# Patient Record
Sex: FEMALE | Race: BLACK OR AFRICAN AMERICAN | NOT HISPANIC OR LATINO | Employment: FULL TIME | URBAN - METROPOLITAN AREA
[De-identification: names, ages, dates, MRNs, and addresses within clinical notes are randomized per-mention and may not be internally consistent; named-entity substitution may affect disease eponyms.]

---

## 2018-11-06 ENCOUNTER — OCCUPATIONAL HEALTH (OUTPATIENT)
Dept: URGENT CARE | Facility: CLINIC | Age: 43
End: 2018-11-06

## 2018-11-06 DIAGNOSIS — Z02.83 ENCOUNTER FOR DRUG SCREENING: Primary | ICD-10-CM

## 2024-03-15 DIAGNOSIS — M25.562 ACUTE PAIN OF LEFT KNEE: Primary | ICD-10-CM

## 2024-03-18 ENCOUNTER — OFFICE VISIT (OUTPATIENT)
Dept: ORTHOPEDICS | Facility: CLINIC | Age: 49
End: 2024-03-18
Payer: MEDICAID

## 2024-03-18 ENCOUNTER — HOSPITAL ENCOUNTER (OUTPATIENT)
Dept: RADIOLOGY | Facility: HOSPITAL | Age: 49
Discharge: HOME | End: 2024-03-18
Attending: NURSE PRACTITIONER
Payer: MEDICAID

## 2024-03-18 DIAGNOSIS — M25.462 KNEE EFFUSION, LEFT: ICD-10-CM

## 2024-03-18 DIAGNOSIS — M17.12 PRIMARY OSTEOARTHRITIS OF LEFT KNEE: ICD-10-CM

## 2024-03-18 DIAGNOSIS — M25.562 ACUTE PAIN OF LEFT KNEE: Primary | ICD-10-CM

## 2024-03-18 DIAGNOSIS — M25.562 ACUTE PAIN OF LEFT KNEE: ICD-10-CM

## 2024-03-18 PROCEDURE — 73562 X-RAY EXAM OF KNEE 3: CPT | Mod: TC,LT

## 2024-03-18 PROCEDURE — 1159F MED LIST DOCD IN RCRD: CPT | Mod: CPTII,,, | Performed by: NURSE PRACTITIONER

## 2024-03-18 PROCEDURE — 99999 PR PBB SHADOW E&M-EST. PATIENT-LVL III: CPT | Mod: PBBFAC,,, | Performed by: NURSE PRACTITIONER

## 2024-03-18 PROCEDURE — 97110 THERAPEUTIC EXERCISES: CPT | Mod: GP,,, | Performed by: NURSE PRACTITIONER

## 2024-03-18 PROCEDURE — 3044F HG A1C LEVEL LT 7.0%: CPT | Mod: CPTII,,, | Performed by: NURSE PRACTITIONER

## 2024-03-18 PROCEDURE — 99213 OFFICE O/P EST LOW 20 MIN: CPT | Mod: PBBFAC,25 | Performed by: NURSE PRACTITIONER

## 2024-03-18 PROCEDURE — 99203 OFFICE O/P NEW LOW 30 MIN: CPT | Mod: S$PBB,,, | Performed by: NURSE PRACTITIONER

## 2024-03-18 PROCEDURE — 1160F RVW MEDS BY RX/DR IN RCRD: CPT | Mod: CPTII,,, | Performed by: NURSE PRACTITIONER

## 2024-03-18 RX ORDER — HYDROCHLOROTHIAZIDE 12.5 MG/1
1 CAPSULE ORAL DAILY
COMMUNITY
Start: 2023-12-19 | End: 2024-12-18

## 2024-03-18 RX ORDER — METFORMIN HYDROCHLORIDE 500 MG/1
500 TABLET, EXTENDED RELEASE ORAL 2 TIMES DAILY
COMMUNITY
Start: 2023-12-19 | End: 2024-12-18

## 2024-03-18 RX ORDER — LEVONORGESTREL 52 MG/1
1 INTRAUTERINE DEVICE INTRAUTERINE ONCE
COMMUNITY

## 2024-03-18 RX ORDER — DICLOFENAC SODIUM 75 MG/1
75 TABLET, DELAYED RELEASE ORAL 2 TIMES DAILY
COMMUNITY
Start: 2024-03-12

## 2024-03-18 RX ORDER — DULAGLUTIDE 1.5 MG/.5ML
1.5 INJECTION, SOLUTION SUBCUTANEOUS
COMMUNITY
Start: 2024-01-30

## 2024-03-18 NOTE — PROGRESS NOTES
Subjective:      New patient visit: Left Knee pain:     Patient ID: Mick Thacker is a 48 y.o. female here for new pt visit for left knee pain.     Date of Injury: 24     Chief Complaint: Lt knee pain     HPI:  Patient presents for evaluation and treatment of left knee pain. She is referred by [facility]( KARIME Zhu MD). She states onset has been off and on for the past year or so. She states that she was seeing her PCP recently and was treated with Diclofenac. She states that she was having increased pain in the left lower leg. She reports having an arterial and venous scan done on 24, both negative except the venous us showed a small bakers cyst. She reports having increased pain in the knee after the knee popped on 24. She states that she was arising after sitting on a couch and had immediate pain and difficulty bearing weight. She reported to the ER the following day at [facility]. She was informed that she may have internal derangement. She was instructed to continue her Diclofenac, placed on crutches and instructed to see orthopedics.     She presents and rates her pain as 4/10. She states that the pain has decreased since the reported injury. The pain is over the anterior knee. She describes the pain as throbbing. She has pain to extend the knee. Current symptoms include: pain directly over the anterior knee and patella. Pain is aggravated by any weight bearing, ADLs or ROM. She denies any locking but states that the knee has giving way. She is noted with a limp. She states that she can not fully extend the knee due to discomfort. She states that she tried a brace but would not stay in place. She reports that she has been out of work since the ER visit.      Past Medical History:   Arthritis   Diabetes mellitus (HCC)   Heartburn   Hypertension     Past Surgical History:   Procedure Laterality Date    SECTION   CHOLECYSTECTOMY     Social History     Tobacco Use    Smoking status: Never   Smokeless tobacco: Never   Vaping Use   Vaping Use: Never used   Substance Use Topics   Alcohol use: Yes   Comment: occasionally   Drug use: No     Allergies   Lisinopril      Review of Systems:    Constitional: No change in weight, No weakness, No fatigue, and No fevers, sweats, or chills  Musculoskelatal: pain in LT knee joint  Skin:  color, texture and temperature normal  Psychiatric:  No depression and No anxiety      Objective:     Antalgic gait.      Physical Exam:  Alignment: normal  Q-Angle: R - < 15 degrees,  L- < 15 degrees       ROM:   R - Flexion - 0 degrees,    Extension - 120 degrees  L - Flexion - 5 degrees,    Extension - 110 degrees  R- Strength:  Extension - 5/5      Flexion - 5/5  L - Strength:  Extension - 4/5      Flexion - 4/5  Effusion:  0-1+ LT knee     Pat/Fem: (LT Knee)  Crepitus - Mild  Patellar grind test - negative   Pat tendon tenderness - negative  Tibial tubercle tenderness - negative  Apprehension - negative  Illiotibial band tenderness - negative   Anserine bursa tenderness - negative   Quad lift intact.     Ligaments: (LT Knee)  MCL: Medial opening @ 0:negative      @ 30: negative  Pain with palpation - negative  LCL: Lateral opening @0: negative      @ 30: negative  Pain with palpation - guarded     ACL: Lachman - negative      Ant Drawer - negative  PCL:  Sag - negative           Post Drawer - negative     Meniscus:  (LT Knee)  JL tenderness - lateral  Brenna - guarded  Pain with forced flexion - positive     Popliteal mass - small bakers cyst     General appearance: NAD  Peripheral pulses: normal  Skin examination: normal  Reflexes: normal  Mood and Effect: normal  Palpation of lymph nodes: normal  Coordination: normal  Sensation: normal     Xray: LT knee ordered and reviewed from today by me     Lateral patellofemoral joint space narrowing with marginal osteophytes.   Small medial and lateral compartment osteophytes as well.   Joint effusion. No  acute fracture detected.    02/22/24: US arterial  duplex LE LT done offsite and reviewed:  No focal stenosis or thrombosis.     02/22/24: US venous duplex LE LT done offsite and reviewed:  No DVT seen. Small bakers cyst within the left popliteal fossa.      Assessment:      LT knee pain, DJD, Bakers cyst and effusion.      Plan:      The total face-to-face encounter time with this patient was 35 mins and greater than 50% of of the encounter time was spent counseling the patient, coordinating care, and education regarding the pathology and natural history of her diagnosis. We have discussed a variety of treatment options including medications, injections, physician directed exercises and other alternative treatments.    I made the decision to obtain old records of the patient including previous PCP notes and imaging. New imaging was ordered today of the extremity evaluated. I independently reviewed and interpreted the radiographs today as well as prior imaging.    Radiographs consistent with lateral patellofemoral joint space narrowing/DJD and mild DJD involving the medial and lateral compartments.   She will start physician directed exercises today for the left knee. Will be done 3 x wk x 6 wks. Start directed physician guided exercises for ROM and strengthening- [facility] knee conditioning packet given with exercises reviewed. HEP 61312 - She was instructed and demonstrated exercises per [facility] knee rehab exercises for HEP which include Heel cord stretch, standing quad stretch, supine hamstring stretch, half squats, hamstring curls, calf raises, leg extensions, straight leg raises, hip abduction, hip adduction and  leg presses. The patient then demonstrated understanding of exercises and proper technique. This program was performed for 15 minutes.  WBAT. Continue crutches as needed. Enc to ambulate. Ice and limit stairs and squatting.    Continue Diclofenac and may add Tylenol as needed.   Discussed LT knee steroid  injection, will hold off for now since the knee pain has decreased some.   RTC 1 week for follow up. Work excuse given. Will reassess next week, consider steroid injection if needed.   She had no further questions.

## 2024-03-25 ENCOUNTER — OFFICE VISIT (OUTPATIENT)
Dept: ORTHOPEDICS | Facility: CLINIC | Age: 49
End: 2024-03-25
Payer: MEDICAID

## 2024-03-25 DIAGNOSIS — M17.12 PRIMARY OSTEOARTHRITIS OF LEFT KNEE: Primary | ICD-10-CM

## 2024-03-25 PROCEDURE — 99212 OFFICE O/P EST SF 10 MIN: CPT | Mod: PBBFAC,25 | Performed by: NURSE PRACTITIONER

## 2024-03-25 PROCEDURE — 1160F RVW MEDS BY RX/DR IN RCRD: CPT | Mod: CPTII,,, | Performed by: NURSE PRACTITIONER

## 2024-03-25 PROCEDURE — 20610 DRAIN/INJ JOINT/BURSA W/O US: CPT | Mod: S$PBB,LT,, | Performed by: NURSE PRACTITIONER

## 2024-03-25 PROCEDURE — 1159F MED LIST DOCD IN RCRD: CPT | Mod: CPTII,,, | Performed by: NURSE PRACTITIONER

## 2024-03-25 PROCEDURE — 20610 DRAIN/INJ JOINT/BURSA W/O US: CPT | Mod: PBBFAC | Performed by: NURSE PRACTITIONER

## 2024-03-25 PROCEDURE — 99999 PR PBB SHADOW E&M-EST. PATIENT-LVL II: CPT | Mod: PBBFAC,,, | Performed by: NURSE PRACTITIONER

## 2024-03-25 PROCEDURE — 99999PBSHW PR PBB SHADOW TECHNICAL ONLY FILED TO HB: Mod: PBBFAC,,,

## 2024-03-25 PROCEDURE — 99213 OFFICE O/P EST LOW 20 MIN: CPT | Mod: S$PBB,25,, | Performed by: NURSE PRACTITIONER

## 2024-03-25 PROCEDURE — 3044F HG A1C LEVEL LT 7.0%: CPT | Mod: CPTII,,, | Performed by: NURSE PRACTITIONER

## 2024-03-25 RX ORDER — TRIAMCINOLONE ACETONIDE 40 MG/ML
40 INJECTION, SUSPENSION INTRA-ARTICULAR; INTRAMUSCULAR
Status: DISCONTINUED | OUTPATIENT
Start: 2024-03-25 | End: 2024-03-25 | Stop reason: HOSPADM

## 2024-03-25 RX ADMIN — TRIAMCINOLONE ACETONIDE 40 MG: 40 INJECTION, SUSPENSION INTRA-ARTICULAR; INTRAMUSCULAR at 10:03

## 2024-03-25 NOTE — PROCEDURES
Large Joint Aspiration/Injection: L knee    Date/Time: 3/25/2024 10:00 AM    Performed by: Gustavo Doran NP  Authorized by: Gustavo Doran NP    Consent Done?:  Yes (Written)  Indications:  Arthritis  Timeout: prior to procedure the correct patient, procedure, and site was verified    Prep: patient was prepped and draped in usual sterile fashion      Local anesthesia used?: Yes    Local anesthetic:  Lidocaine 1% without epinephrine  Anesthetic total (ml):  2      Details:  Needle Size:  22 G  Ultrasonic Guidance for needle placement?: No    Approach:  Lateral  Location:  Knee  Site:  L knee  Medications:  40 mg triamcinolone acetonide 40 mg/mL  Aspirate amount (mL):  3  Aspirate:  Clear  Patient tolerance:  Patient tolerated the procedure well with no immediate complications     Risks reviewed: pain, swelling, infection and nerve or tendon injury.     ALICE Terrell MA in room

## 2024-03-25 NOTE — LETTER
March 25, 2024      [facility]  [address]   [address]  [facility] [address]  Phone: [phone]  Fax: [phone]       Patient: Mick Thacker   YOB: 1975  Date of Visit: 03/25/2024    To Whom It May Concern:    Aaliyah Thacker  was at [facility] on 03/25/2024. The patient may return to work/school on 03/27/23. Please allow to sit if needed for any flare ups.  If you have any questions or concerns, or if I can be of further assistance, please do not hesitate to contact me.    Sincerely,    Gustavo Doran, NP

## 2024-03-25 NOTE — PROGRESS NOTES
Subjective:      Follow up visit: Left Knee pain:     Patient ID: Mick Thacker is a 48 y.o. female here for follow up visit for left knee pain.     Date of Injury: 24     Chief Complaint: Lt knee pain     HPI:  Patient presents for follow up for previous left knee pain. She is 2 weeks post injury. She presents and states that the knee is doing about the same. She rates her pain as 4/10. She continues on the crutches. She states that the pain is over the anterior and lateral knee. She describes the pain as aching. She has pain to extend the knee as previous. Current symptoms include: pain directly over the anterior knee, patella and lateral aspect today. Pain is aggravated by any weight bearing, ADLs or ROM. She denies any locking. She reports occasional giving way. She is noted with a mild limp. She is doing the physician directed exercises, ice, compression and Diclofenac as directed.     Past Medical History:   Arthritis   Diabetes mellitus (HCC)   Heartburn   Hypertension     Past Surgical History:   Procedure Laterality Date    SECTION   CHOLECYSTECTOMY     Social History     Tobacco Use   Smoking status: Never   Smokeless tobacco: Never   Vaping Use   Vaping Use: Never used   Substance Use Topics   Alcohol use: Yes   Comment: occasionally   Drug use: No     Allergies   Lisinopril      Review of Systems:    Constitional: No change in weight, No weakness, No fatigue, and No fevers, sweats, or chills  Musculoskelatal: pain in LT knee joint  Skin:  color, texture and temperature normal  Psychiatric:  No depression and No anxiety      Objective:      Antalgic gait- mild     Physical Exam:  Alignment: normal  Q-Angle: R - < 15 degrees,  L- < 15 degrees       ROM:   R - Flexion - 0 degrees,    Extension - 120 degrees  L - Flexion - 5 degrees,    Extension - 110 degrees  R- Strength:  Extension - 5/5      Flexion - 5/5  L - Strength:  Extension - 4/5      Flexion - 4/5  Effusion:  0-1+ LT knee      Pat/Fem: (LT Knee)  Crepitus - Mild  Patellar grind test - mild tenderness   Pat tendon tenderness - negative  Tibial tubercle tenderness - negative  Apprehension - negative  Illiotibial band tenderness - negative   Anserine bursa tenderness - negative   Quad lift intact.     Ligaments: (LT Knee)  MCL: Medial opening @ 0:negative      @ 30: negative  Pain with palpation - negative  LCL: Lateral opening @0: negative      @ 30: negative  Pain with palpation - guarded     ACL: Lachman - negative      Ant Drawer - negative  PCL:  Sag - negative           Post Drawer - negative     Meniscus:  (LT Knee)  JL tenderness - lateral  Brenna - guarded  Pain with forced flexion - positive     Popliteal mass - small bakers cyst     General appearance: NAD  Peripheral pulses: normal  Skin examination: normal  Reflexes: normal  Mood and Effect: normal  Palpation of lymph nodes: normal  Coordination: normal  Sensation: normal     Xray: LT knee ordered and reviewed again from 03/18/24      Lateral patellofemoral joint space narrowing with marginal osteophytes.   Small medial and lateral compartment osteophytes as well.   Joint effusion. No acute fracture detected.       Assessment:      LT knee pain, DJD, Bakers cyst and effusion.      Plan:      She has made only mild clinical progress.  Continue physician directed exercises 3 x wk as directed.   WBAT. Continue crutches prn. Enc to ambulate. Ice and limit stairs and squatting.    Continue Diclofenac and Tylenol as needed.   LT knee steroid injection done, see procedure note. She had immediate post injection relief.   RTC 4 week for follow up. Work excuse given. Will get MRI if needed.   She had no further questions.     PROCEDURE: LT knee intra-articular injection  I have explained the risks, benefits, and alternatives of the procedure in detail.  The patient voices understanding and all questions have been answered.  The patient agrees to proceed as planned. So after I  performed a sterile prep of the skin in the normal fashion, the left knee is aspirated from the suprior lateral approach utilizing an 22 gauge needle. I injected with a combination of 3 cc lidocaine and 6 mg of kenalog.  The patient is cautioned and immediate relief of pain is secondary to the local anesthetic and will be temporary.  After the anesthetic wears off there may be a increase in pain that may last for a few hours or a few days and they should use ice to help alleviate this flair up of pain.

## 2024-04-22 ENCOUNTER — OFFICE VISIT (OUTPATIENT)
Dept: ORTHOPEDICS | Facility: CLINIC | Age: 49
End: 2024-04-22
Payer: MEDICAID

## 2024-04-22 DIAGNOSIS — M17.12 PRIMARY OSTEOARTHRITIS OF LEFT KNEE: Primary | ICD-10-CM

## 2024-04-22 DIAGNOSIS — M25.562 ACUTE PAIN OF LEFT KNEE: ICD-10-CM

## 2024-04-22 DIAGNOSIS — S83.282D ACUTE LATERAL MENISCUS TEAR OF LEFT KNEE, SUBSEQUENT ENCOUNTER: ICD-10-CM

## 2024-04-22 DIAGNOSIS — M25.462 KNEE EFFUSION, LEFT: ICD-10-CM

## 2024-04-22 PROCEDURE — 1160F RVW MEDS BY RX/DR IN RCRD: CPT | Mod: CPTII,,, | Performed by: NURSE PRACTITIONER

## 2024-04-22 PROCEDURE — 99999 PR PBB SHADOW E&M-EST. PATIENT-LVL III: CPT | Mod: PBBFAC,,, | Performed by: NURSE PRACTITIONER

## 2024-04-22 PROCEDURE — 99213 OFFICE O/P EST LOW 20 MIN: CPT | Mod: PBBFAC | Performed by: NURSE PRACTITIONER

## 2024-04-22 PROCEDURE — 1159F MED LIST DOCD IN RCRD: CPT | Mod: CPTII,,, | Performed by: NURSE PRACTITIONER

## 2024-04-22 PROCEDURE — 3044F HG A1C LEVEL LT 7.0%: CPT | Mod: CPTII,,, | Performed by: NURSE PRACTITIONER

## 2024-04-22 PROCEDURE — 99213 OFFICE O/P EST LOW 20 MIN: CPT | Mod: S$PBB,,, | Performed by: NURSE PRACTITIONER

## 2024-04-22 NOTE — PROGRESS NOTES
Subjective:      Follow up visit: Left Knee pain:     Patient ID: Mick Thacker is a 48 y.o. female here for follow up visit for left knee pain.     Date of Injury: 24     Chief Complaint: Lt knee pain     HPI:  Patient presents for follow up for previous left knee pain. She is 6 weeks post injury. She presents and states that the knee only had mild improvement post injection, ice, bracing and Nsaid. She states that she did have a MRI done recently that was ordered by her PCP. She presents and rates her pain as 5/10. She states that the pain is over the anterior and lateral knee as previous. She describes the pain as constant and aching. She has pain to extend the knee as previous. Current symptoms include: pain directly over the anterior knee, patella and lateral aspect today. Pain is aggravated by any weight bearing, ADLs or ROM. She denies any locking. She reports occasional giving way. She is noted with a mild limp.      Past Medical History:   Arthritis   Diabetes mellitus (HCC)   Heartburn   Hypertension     Past Surgical History:   Procedure Laterality Date    SECTION   CHOLECYSTECTOMY     Social History     Tobacco Use   Smoking status: Never   Smokeless tobacco: Never   Vaping Use   Vaping Use: Never used   Substance Use Topics   Alcohol use: Yes   Comment: occasionally   Drug use: No     Allergies   Lisinopril      Review of Systems:    Constitional: No change in weight, No weakness, No fatigue, and No fevers, sweats, or chills  Musculoskelatal: pain in LT knee joint  Skin:  color, texture and temperature normal  Psychiatric:  No depression and No anxiety      Objective:      Antalgic gait     Physical Exam:  Alignment: normal  Q-Angle: R - < 15 degrees,  L- < 15 degrees       ROM:   R - Flexion - 0 degrees,    Extension - 120 degrees  L - Flexion - 5 degrees,    Extension - 110 degrees  R- Strength:  Extension - 5/5      Flexion - 5/5  L - Strength:  Extension - 4/5      Flexion -  4/5  Effusion:  0-1+ LT knee     Pat/Fem: (LT Knee)  Crepitus - Mild  Patellar grind test - mild tenderness   Pat tendon tenderness - negative  Tibial tubercle tenderness - negative  Apprehension - negative  Illiotibial band tenderness - negative   Anserine bursa tenderness - negative   Quad lift intact.     Ligaments: (LT Knee)  MCL: Medial opening @ 0:negative      @ 30: negative  Pain with palpation - negative  LCL: Lateral opening @0: negative      @ 30: negative  Pain with palpation - guarded     ACL: Lachman - negative      Ant Drawer - negative  PCL:  Sag - negative           Post Drawer - negative     Meniscus:  (LT Knee)  JL tenderness - lateral  Brenna - guarded  Pain with forced flexion - positive     Popliteal mass - small bakers cyst     General appearance: NAD  Peripheral pulses: normal  Skin examination: normal  Reflexes: normal  Mood and Effect: normal  Palpation of lymph nodes: normal  Coordination: normal  Sensation: normal     Xray: LT knee ordered and reviewed again from 03/18/24      Lateral patellofemoral joint space narrowing with marginal osteophytes.   Small medial and lateral compartment osteophytes as well.   Joint effusion. No acute fracture detected.      MRI LT Knee: reviewed from 04/09/24  1. The ACL, PCL, MCL, and LCL are intact.   2. Tear of the anterior and posterior horn of the lateral meniscus. Degenerative signal change within the medical meniscus with possible tear posteriorly.   3. Moderate joint effusion. Small Baker's cyst.   4. 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   5. Mild prepatellar edema.   6. Mild thickening of the superior plica.      Assessment:      LT knee pain, DJD, lateral meniscal tear, bakers cyst     Plan:      She has made only mild clinical progress. MRI consistent with lateral meniscal tear and DJD. Will make referral to therapy for ROM and strengthening. Will also make referral to [facility] to evaluate for lateral meniscal tear.    Continue physician directed exercises 3 x wk as directed.   WBAT. Enc to ambulate. Ice and limit stairs and squatting.    Continue Diclofenac and Tylenol as previous.   RTC prn.   She had no further questions.